# Patient Record
Sex: MALE | Race: AMERICAN INDIAN OR ALASKA NATIVE | HISPANIC OR LATINO | Employment: FULL TIME | URBAN - METROPOLITAN AREA
[De-identification: names, ages, dates, MRNs, and addresses within clinical notes are randomized per-mention and may not be internally consistent; named-entity substitution may affect disease eponyms.]

---

## 2023-10-17 ENCOUNTER — APPOINTMENT (OUTPATIENT)
Dept: INTERPRETER SERVICES | Facility: CLINIC | Age: 43
End: 2023-10-17

## 2023-10-17 ENCOUNTER — HOSPITAL ENCOUNTER (EMERGENCY)
Facility: HOSPITAL | Age: 43
Discharge: HOME | End: 2023-10-17
Payer: COMMERCIAL

## 2023-10-17 VITALS
HEART RATE: 70 BPM | TEMPERATURE: 98.5 F | SYSTOLIC BLOOD PRESSURE: 131 MMHG | OXYGEN SATURATION: 96 % | DIASTOLIC BLOOD PRESSURE: 66 MMHG | RESPIRATION RATE: 18 BRPM

## 2023-10-17 DIAGNOSIS — Z87.828 HISTORY OF MOTOR VEHICLE ACCIDENT: ICD-10-CM

## 2023-10-17 PROCEDURE — 99282 EMERGENCY DEPT VISIT SF MDM: CPT
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
## 2023-10-17 NOTE — DISCHARGE INSTRUCTIONS
You were seen in the ER for work note.    Rest, no heavy lifting >50 lbs. You may apply ice or heat to the area (do not apply ice directly to the skin). You may use a topical pain relieving patch such as Lidoderm or Icy Hot. You can continue to use Ibuprofen (do not exceed 3200 mg in 24 hrs) and/or Tylenol (do not exceed 4000 mg in 24 hrs) as needed.     Follow up with your Primary Care Provider as needed.    Return to ER if any new or worsening symptoms develop including fever/chills, worsening pain, new numbness/tingling/weakness, loss of bowel or bladder function or any other new or concerning symptoms.
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
## 2023-10-17 NOTE — Clinical Note
Giacomo Rodriguez was seen and treated in our emergency department on 10/17/2023.  He may return to work on 10/18/2023.  No heavy lifting/pushing/pulling >50 lbs.      If you have any questions or concerns, please don't hesitate to call.      April River PA-C

## 2024-02-19 ENCOUNTER — HOSPITAL ENCOUNTER (EMERGENCY)
Facility: HOSPITAL | Age: 44
Discharge: LEFT BEFORE DISCHARGE | End: 2024-02-19

## 2024-02-19 PROCEDURE — 99282 EMERGENCY DEPT VISIT SF MDM: CPT

## 2024-02-19 ASSESSMENT — ENCOUNTER SYMPTOMS
HEADACHES: 0
VOMITING: 0
BACK PAIN: 1
SHORTNESS OF BREATH: 0
ABDOMINAL PAIN: 0

## 2024-02-20 ENCOUNTER — HOSPITAL ENCOUNTER (EMERGENCY)
Facility: HOSPITAL | Age: 44
Discharge: HOME | End: 2024-02-20
Admitting: EMERGENCY MEDICINE
Payer: COMMERCIAL

## 2024-02-20 VITALS
TEMPERATURE: 98 F | DIASTOLIC BLOOD PRESSURE: 70 MMHG | HEIGHT: 65 IN | RESPIRATION RATE: 18 BRPM | WEIGHT: 180 LBS | HEART RATE: 70 BPM | SYSTOLIC BLOOD PRESSURE: 118 MMHG | BODY MASS INDEX: 29.99 KG/M2 | OXYGEN SATURATION: 98 %
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
## 2024-02-20 NOTE — Clinical Note
Giacomo Rodriguez was seen and treated in our emergency department on 2/19/2024.  He may return to work on 02/22/2024.       If you have any questions or concerns, please don't hesitate to call.      Layne Anthony PA-C

## 2025-05-06 ENCOUNTER — HOSPITAL ENCOUNTER (EMERGENCY)
Facility: HOSPITAL | Age: 45
Discharge: HOME | End: 2025-05-06
Payer: COMMERCIAL

## 2025-05-06 VITALS
DIASTOLIC BLOOD PRESSURE: 66 MMHG | RESPIRATION RATE: 18 BRPM | TEMPERATURE: 97.8 F | OXYGEN SATURATION: 96 % | HEIGHT: 65 IN | SYSTOLIC BLOOD PRESSURE: 129 MMHG | WEIGHT: 171.3 LBS | HEART RATE: 75 BPM | BODY MASS INDEX: 28.54 KG/M2

## 2025-05-06 DIAGNOSIS — S61.210A LACERATION OF RIGHT INDEX FINGER, FOREIGN BODY PRESENCE UNSPECIFIED, NAIL DAMAGE STATUS UNSPECIFIED, INITIAL ENCOUNTER: ICD-10-CM

## 2025-05-06 PROCEDURE — 250N000011 HC RX IP 250 OP 636

## 2025-05-06 PROCEDURE — 90471 IMMUNIZATION ADMIN: CPT

## 2025-05-06 PROCEDURE — 99283 EMERGENCY DEPT VISIT LOW MDM: CPT | Mod: 25

## 2025-05-06 PROCEDURE — 250N000009 HC RX 250

## 2025-05-06 PROCEDURE — 12002 RPR S/N/AX/GEN/TRNK2.6-7.5CM: CPT | Mod: F6

## 2025-05-06 PROCEDURE — 90715 TDAP VACCINE 7 YRS/> IM: CPT

## 2025-05-06 RX ORDER — GINSENG 100 MG
CAPSULE ORAL ONCE
Status: COMPLETED | OUTPATIENT
Start: 2025-05-06 | End: 2025-05-06

## 2025-05-06 RX ADMIN — BACITRACIN: 500 OINTMENT TOPICAL at 17:32

## 2025-05-06 RX ADMIN — CLOSTRIDIUM TETANI TOXOID ANTIGEN (FORMALDEHYDE INACTIVATED), CORYNEBACTERIUM DIPHTHERIAE TOXOID ANTIGEN (FORMALDEHYDE INACTIVATED), BORDETELLA PERTUSSIS TOXOID ANTIGEN (GLUTARALDEHYDE INACTIVATED), BORDETELLA PERTUSSIS FILAMENTOUS HEMAGGLUTININ ANTIGEN (FORMALDEHYDE INACTIVATED), BORDETELLA PERTUSSIS PERTACTIN ANTIGEN, AND BORDETELLA PERTUSSIS FIMBRIAE 2/3 ANTIGEN 0.5 ML: 5; 2; 2.5; 5; 3; 5 INJECTION, SUSPENSION INTRAMUSCULAR at 17:27

## 2025-05-06 ASSESSMENT — COLUMBIA-SUICIDE SEVERITY RATING SCALE - C-SSRS
1. IN THE PAST MONTH, HAVE YOU WISHED YOU WERE DEAD OR WISHED YOU COULD GO TO SLEEP AND NOT WAKE UP?: NO
6. HAVE YOU EVER DONE ANYTHING, STARTED TO DO ANYTHING, OR PREPARED TO DO ANYTHING TO END YOUR LIFE?: NO
2. HAVE YOU ACTUALLY HAD ANY THOUGHTS OF KILLING YOURSELF IN THE PAST MONTH?: NO

## 2025-05-06 NOTE — ED TRIAGE NOTES
Pt cut 2nd digit of hand accidentally with a . Laceration cleaned with soap and water and dressing applied. Is still bleeding. Unknown tetanus status     Triage Assessment (Adult)       Row Name 05/06/25 1549          Triage Assessment    Airway WDL WDL

## 2025-05-06 NOTE — DISCHARGE INSTRUCTIONS
You had a cut on your finger.  I put stitches in.  You need to have these removed in 7 to 10 days.  If you notice any pus coming from the wound, redness of the arm, fevers come back here for evaluation.  You also should follow-up with a primary care doctor to get the rest of your tetanus vaccine series.    Keep your wound covered at work.  Have some time throughout the day where it is open to air out.
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
